# Patient Record
Sex: MALE | Race: WHITE | NOT HISPANIC OR LATINO
[De-identification: names, ages, dates, MRNs, and addresses within clinical notes are randomized per-mention and may not be internally consistent; named-entity substitution may affect disease eponyms.]

---

## 2023-06-20 ENCOUNTER — APPOINTMENT (OUTPATIENT)
Dept: CARDIOLOGY | Facility: CLINIC | Age: 43
End: 2023-06-20
Payer: COMMERCIAL

## 2023-06-20 ENCOUNTER — NON-APPOINTMENT (OUTPATIENT)
Age: 43
End: 2023-06-20

## 2023-06-20 VITALS
DIASTOLIC BLOOD PRESSURE: 78 MMHG | HEART RATE: 70 BPM | WEIGHT: 227 LBS | BODY MASS INDEX: 34.4 KG/M2 | SYSTOLIC BLOOD PRESSURE: 120 MMHG | RESPIRATION RATE: 16 BRPM | HEIGHT: 68 IN

## 2023-06-20 DIAGNOSIS — Z78.9 OTHER SPECIFIED HEALTH STATUS: ICD-10-CM

## 2023-06-20 DIAGNOSIS — Z82.49 FAMILY HISTORY OF ISCHEMIC HEART DISEASE AND OTHER DISEASES OF THE CIRCULATORY SYSTEM: ICD-10-CM

## 2023-06-20 DIAGNOSIS — Z00.00 ENCOUNTER FOR GENERAL ADULT MEDICAL EXAMINATION W/OUT ABNORMAL FINDINGS: ICD-10-CM

## 2023-06-20 PROCEDURE — 93000 ELECTROCARDIOGRAM COMPLETE: CPT

## 2023-06-20 PROCEDURE — 99244 OFF/OP CNSLTJ NEW/EST MOD 40: CPT

## 2023-07-13 ENCOUNTER — APPOINTMENT (OUTPATIENT)
Dept: CARDIOLOGY | Facility: CLINIC | Age: 43
End: 2023-07-13
Payer: COMMERCIAL

## 2023-07-13 PROCEDURE — 93306 TTE W/DOPPLER COMPLETE: CPT

## 2023-07-20 ENCOUNTER — APPOINTMENT (OUTPATIENT)
Dept: CARDIOLOGY | Facility: CLINIC | Age: 43
End: 2023-07-20
Payer: COMMERCIAL

## 2023-07-20 PROCEDURE — 93351 STRESS TTE COMPLETE: CPT

## 2023-07-20 RX ADMIN — PERFLUTREN MG/ML: 6.52 INJECTION, SUSPENSION INTRAVENOUS at 00:00

## 2023-07-21 RX ORDER — PERFLUTREN 6.52 MG/ML
6.52 INJECTION, SUSPENSION INTRAVENOUS
Qty: 4 | Refills: 0 | Status: COMPLETED | OUTPATIENT
Start: 2023-07-20

## 2023-08-31 ENCOUNTER — NON-APPOINTMENT (OUTPATIENT)
Age: 43
End: 2023-08-31

## 2023-08-31 ENCOUNTER — APPOINTMENT (OUTPATIENT)
Dept: CARDIOLOGY | Facility: CLINIC | Age: 43
End: 2023-08-31
Payer: COMMERCIAL

## 2023-08-31 VITALS
BODY MASS INDEX: 33.8 KG/M2 | SYSTOLIC BLOOD PRESSURE: 126 MMHG | HEIGHT: 68 IN | DIASTOLIC BLOOD PRESSURE: 82 MMHG | HEART RATE: 64 BPM | RESPIRATION RATE: 14 BRPM | WEIGHT: 223 LBS

## 2023-08-31 DIAGNOSIS — R94.31 ABNORMAL ELECTROCARDIOGRAM [ECG] [EKG]: ICD-10-CM

## 2023-08-31 DIAGNOSIS — E78.00 PURE HYPERCHOLESTEROLEMIA, UNSPECIFIED: ICD-10-CM

## 2023-08-31 DIAGNOSIS — R06.02 SHORTNESS OF BREATH: ICD-10-CM

## 2023-08-31 PROCEDURE — 93000 ELECTROCARDIOGRAM COMPLETE: CPT

## 2023-08-31 PROCEDURE — 99214 OFFICE O/P EST MOD 30 MIN: CPT | Mod: 25

## 2023-08-31 RX ORDER — ROSUVASTATIN CALCIUM 10 MG/1
10 TABLET, FILM COATED ORAL DAILY
Refills: 0 | Status: ACTIVE | COMMUNITY

## 2023-08-31 NOTE — REASON FOR VISIT
[FreeTextEntry1] : The patient is a 43-year-old gentleman who came to our office last June for cardiac consultation after having an abnormal EKG at his PCP's office and having a family history for coronary artery disease for both his father and his uncle.  Had also noted getting some occasional shortness of breath on exertion lately when he's running up and down [address] stairs in the city.  He did report doing some physical exercise and sports with ice hockey regularly.   He underwent a Stress Echo study and Transthoracic Echocardiogram and is back today to review those results.  Patient has been feeling overall good lately and denies CP, SOB, orthopnea, PND, palpitations, presyncope, syncope, edema.

## 2023-08-31 NOTE — HISTORY OF PRESENT ILLNESS
[FreeTextEntry1] : Transthoracic Echocardiogram (7/13/2023):  Normal LV cavity size and normal LV wall thickness with normal LV systolic function; LVEF of 50 to 55% (after further review, EF actually appeared to be closer to 55 to 60%.  The left and right atria are normal in size and structure.  There was no significant valvulopathy noted.   Exercise Stress Echo (7/20/2023):  Negative exercise stress echocardiogram.  There were no ischemic ST segment changes and LV systolic function was normal with LVEF of 55 to 60%.  There were no LV regional wall motion abnormalities.  There were occasional PVCs and PACs noted during stress.  Patient achieved 10 METS, which is consistent with average exercise capacity. ie- negative study.   Patient reports having history for asthma for which he takes a rescue inhaler PRN, although, he has not needed to take his inhaler for quite awhile.   Most recent fasting labs are much more favorable since modifying his diet and starting Crestor 10 mg.

## 2023-08-31 NOTE — ASSESSMENT
[FreeTextEntry1] : EKG 8/31/2023:  The EKG illustrates sinus rhythm, rate of 64 bpm, prominent R(V1) and left axis deviation.  Early R wave transition V1 to V2.    Transthoracic Echocardiogram (7/13/2023):  Normal LV cavity size and normal LV wall thickness with normal LV systolic function; LVEF of 50 to 55% (after further review, EF actually appeared to be closer to 55 to 60%.  The left and right atria are normal in size and structure.  There was no significant valvulopathy noted.   Exercise Stress Echo (7/20/2023):  Negative exercise stress echocardiogram.  There were no ischemic ST segment changes and LV systolic function was normal with LVEF of 55 to 60%.  There were no LV regional wall motion abnormalities.  There were occasional PVCs and PACs noted during stress.  Patient achieved 10 METS, which is consistent with average exercise capacity. ie- negative study.   Patient reports having history for asthma for which he takes a rescue inhaler PRN, although, he has not needed to take his inhaler for quite awhile.   Most recent fasting labs are much more favorable since modifying his diet and starting Crestor 10 mg.     PLAN:  1).  Patient reassured.   2).  Will titrate down his Crestor to 5 mg nightly which should continue to benefit him with reducing his lipids without taking too high of Statin medication.   3).  Continue with diet and lifestyle modification including low sodium, low fat and low carbohydrate weight reducing diet.  Patient is to continue to implement aerobic exercise regimen few days per week.   4).  Follow up with PCP (Dr. Mcnair) regarding routine checkups and fasting blood work including lipid panel.  Forward all testing/lab work to our office.   5).  Recommend patient report any untoward symptoms.   6).  Follow up with Dr. Spicer in 12 months or PRN.

## 2024-12-25 PROBLEM — F10.90 ALCOHOL USE: Status: ACTIVE | Noted: 2023-06-20

## 2025-02-05 ENCOUNTER — NON-APPOINTMENT (OUTPATIENT)
Age: 45
End: 2025-02-05

## 2025-05-27 ENCOUNTER — NON-APPOINTMENT (OUTPATIENT)
Age: 45
End: 2025-05-27